# Patient Record
Sex: FEMALE | Race: ASIAN | URBAN - METROPOLITAN AREA
[De-identification: names, ages, dates, MRNs, and addresses within clinical notes are randomized per-mention and may not be internally consistent; named-entity substitution may affect disease eponyms.]

---

## 2020-10-27 ENCOUNTER — OFFICE VISIT (OUTPATIENT)
Dept: ONCOLOGY | Age: 40
End: 2020-10-27

## 2020-10-27 VITALS
WEIGHT: 170.4 LBS | HEIGHT: 64 IN | OXYGEN SATURATION: 99 % | RESPIRATION RATE: 14 BRPM | SYSTOLIC BLOOD PRESSURE: 123 MMHG | BODY MASS INDEX: 29.09 KG/M2 | DIASTOLIC BLOOD PRESSURE: 83 MMHG | TEMPERATURE: 98.7 F | HEART RATE: 67 BPM

## 2020-10-27 DIAGNOSIS — Z84.81 FAMILY HISTORY OF BRCA GENE POSITIVE: Primary | ICD-10-CM

## 2020-10-27 PROCEDURE — 99203 OFFICE O/P NEW LOW 30 MIN: CPT | Performed by: OBSTETRICS & GYNECOLOGY

## 2020-10-27 RX ORDER — ERGOCALCIFEROL 1.25 MG/1
50000 CAPSULE ORAL
COMMUNITY

## 2020-10-27 RX ORDER — LEVOTHYROXINE SODIUM 150 UG/1
TABLET ORAL
COMMUNITY

## 2020-10-27 NOTE — PROGRESS NOTES
[address]  [address] [place] [address], [address], [facility]  [address] [facility], [address] [facility], [address] [place] [address] [place]   [phone]  Greg Aguilar DO      Patient ID:  Name:  Isaak Griffith  MRN:  [id number]  :  1980/40 y.o. Date:  10/27/2020      HISTORY OF PRESENT ILLNESS:  Isaak Griffith is a 36 y.o.  premenopausalpremenopausal female self referred for family h/o ovarian cancer and BRCA mutation. Mother diagnosed with stage IIIC ovarian cancer and BRCA mutation. Here for genetic testing. Labs:  none      Imaging  none       ROS:   As above      There are no active problems to display for this patient. Past Medical History:   Diagnosis Date    History of IBS     Thyroid disease       History reviewed. No pertinent surgical history. OB History    No obstetric history on file. Social History     Tobacco Use    Smoking status: Not on file   Substance Use Topics    Alcohol use: Yes     Comment: social      No family history on file. Current Outpatient Medications   Medication Sig    levothyroxine (SYNTHROID) 150 mcg tablet Take  by mouth Daily (before breakfast).  ergocalciferol (Vitamin D2) 1,250 mcg (50,000 unit) capsule Take 50,000 Units by mouth. No current facility-administered medications for this visit. No Known Allergies       OBJECTIVE:    Physical Exam  VITAL SIGNS: Visit Vitals  /83 (BP 1 Location: Left arm, BP Patient Position: Sitting)   Pulse 67   Temp 98.7 °F (37.1 °C) (Oral)   Resp 14   Ht 5' 4\" (1.626 m)   Wt 77.3 kg (170 lb 6.4 oz)   LMP 10/27/2020   SpO2 99%   BMI 29.25 kg/m²      GENERAL MARC: in no apparent distress and well developed and well nourished         IMPRESSION/PLAN:  1. Family h/o BRCA mutation   Genetic testing done today .  Will call with results      The total time spent was 30 minutes regarding this patients diagnosis of family h/o brca mutation and >50% of this time was spent counseling and coordinating care    [address] [place] [address]  Gynecologic Oncology  10/27/39609:40 PM

## 2020-10-27 NOTE — PROGRESS NOTES
Joey Nicole is a 36 y.o. female presents in office for new patient exam    Chief Complaint   Patient presents with    New Patient        Do you have any unusual vaginal bleeding, discharge or irritation? No  Do you have any changes in your bowel movements? No  Have you been experiencing nausea or vomiting? No  Have you been experiencing any continuous or worsening abdominal pain? No  Any urinary burning? No    Visit Vitals  /83 (BP 1 Location: Left arm, BP Patient Position: Sitting)   Pulse 67   Temp 98.7 °F (37.1 °C) (Oral)   Resp 14   Ht 5' 4\" (1.626 m)   Wt 77.3 kg (170 lb 6.4 oz)   SpO2 99%   BMI 29.25 kg/m²         1. Have you been to the ER, urgent care clinic since your last visit? Hospitalized since your last visit? No    2. Have you seen or consulted any other health care providers outside of the [address] [place] [address] since your last visit? Include any pap smears or colon screening. No    3 most recent PHQ Screens 10/27/2020   Little interest or pleasure in doing things Not at all   Feeling down, depressed, irritable, or hopeless Not at all   Total Score PHQ 2 0       No flowsheet data found. No flowsheet data found. No flowsheet data found.